# Patient Record
Sex: FEMALE | Race: WHITE | NOT HISPANIC OR LATINO | Employment: OTHER | URBAN - METROPOLITAN AREA
[De-identification: names, ages, dates, MRNs, and addresses within clinical notes are randomized per-mention and may not be internally consistent; named-entity substitution may affect disease eponyms.]

---

## 2018-04-12 ENCOUNTER — OFFICE VISIT CONVERTED (OUTPATIENT)
Dept: OTHER | Facility: HOSPITAL | Age: 73
End: 2018-04-12
Attending: INTERNAL MEDICINE

## 2018-05-17 ENCOUNTER — OFFICE VISIT CONVERTED (OUTPATIENT)
Dept: OTHER | Facility: HOSPITAL | Age: 73
End: 2018-05-17
Attending: INTERNAL MEDICINE

## 2018-07-19 ENCOUNTER — OFFICE VISIT CONVERTED (OUTPATIENT)
Dept: OTHER | Facility: HOSPITAL | Age: 73
End: 2018-07-19
Attending: INTERNAL MEDICINE

## 2019-02-07 ENCOUNTER — OFFICE VISIT CONVERTED (OUTPATIENT)
Dept: OTHER | Facility: HOSPITAL | Age: 74
End: 2019-02-07
Attending: INTERNAL MEDICINE

## 2019-06-13 ENCOUNTER — OFFICE VISIT CONVERTED (OUTPATIENT)
Dept: OTHER | Facility: HOSPITAL | Age: 74
End: 2019-06-13
Attending: INTERNAL MEDICINE

## 2019-10-17 ENCOUNTER — OFFICE VISIT CONVERTED (OUTPATIENT)
Dept: OTHER | Facility: HOSPITAL | Age: 74
End: 2019-10-17
Attending: INTERNAL MEDICINE

## 2020-02-13 ENCOUNTER — OFFICE VISIT CONVERTED (OUTPATIENT)
Dept: OTHER | Facility: HOSPITAL | Age: 75
End: 2020-02-13
Attending: INTERNAL MEDICINE

## 2020-06-11 ENCOUNTER — CONVERSION ENCOUNTER (OUTPATIENT)
Dept: OTHER | Facility: HOSPITAL | Age: 75
End: 2020-06-11

## 2021-05-28 VITALS
HEART RATE: 83 BPM | BODY MASS INDEX: 24.7 KG/M2 | TEMPERATURE: 98.8 F | DIASTOLIC BLOOD PRESSURE: 80 MMHG | DIASTOLIC BLOOD PRESSURE: 67 MMHG | HEIGHT: 63 IN | WEIGHT: 139.4 LBS | DIASTOLIC BLOOD PRESSURE: 78 MMHG | BODY MASS INDEX: 22.9 KG/M2 | HEART RATE: 102 BPM | RESPIRATION RATE: 18 BRPM | TEMPERATURE: 98.9 F | RESPIRATION RATE: 16 BRPM | BODY MASS INDEX: 22.78 KG/M2 | OXYGEN SATURATION: 93 % | DIASTOLIC BLOOD PRESSURE: 82 MMHG | WEIGHT: 138.1 LBS | DIASTOLIC BLOOD PRESSURE: 68 MMHG | OXYGEN SATURATION: 97 % | HEART RATE: 107 BPM | WEIGHT: 133.6 LBS | TEMPERATURE: 97.7 F | SYSTOLIC BLOOD PRESSURE: 145 MMHG | SYSTOLIC BLOOD PRESSURE: 140 MMHG | TEMPERATURE: 97.8 F | HEIGHT: 63 IN | HEIGHT: 63 IN | HEIGHT: 63 IN | WEIGHT: 129.25 LBS | WEIGHT: 128.56 LBS | OXYGEN SATURATION: 90 % | SYSTOLIC BLOOD PRESSURE: 137 MMHG | HEIGHT: 63 IN | OXYGEN SATURATION: 96 % | TEMPERATURE: 98 F | HEART RATE: 76 BPM | OXYGEN SATURATION: 98 % | SYSTOLIC BLOOD PRESSURE: 156 MMHG | RESPIRATION RATE: 18 BRPM | BODY MASS INDEX: 23.04 KG/M2 | HEIGHT: 63 IN | WEIGHT: 137.4 LBS | HEIGHT: 63 IN | HEART RATE: 108 BPM | HEART RATE: 86 BPM | SYSTOLIC BLOOD PRESSURE: 133 MMHG | WEIGHT: 130 LBS | BODY MASS INDEX: 24.29 KG/M2 | DIASTOLIC BLOOD PRESSURE: 77 MMHG | WEIGHT: 137.1 LBS | HEART RATE: 90 BPM | HEIGHT: 63 IN | DIASTOLIC BLOOD PRESSURE: 84 MMHG | DIASTOLIC BLOOD PRESSURE: 78 MMHG | TEMPERATURE: 98 F | OXYGEN SATURATION: 96 % | TEMPERATURE: 98 F | OXYGEN SATURATION: 98 % | RESPIRATION RATE: 16 BRPM | BODY MASS INDEX: 23.67 KG/M2 | OXYGEN SATURATION: 98 % | HEART RATE: 88 BPM | BODY MASS INDEX: 24.34 KG/M2 | SYSTOLIC BLOOD PRESSURE: 160 MMHG | SYSTOLIC BLOOD PRESSURE: 122 MMHG | SYSTOLIC BLOOD PRESSURE: 143 MMHG | BODY MASS INDEX: 24.47 KG/M2 | RESPIRATION RATE: 18 BRPM

## 2021-05-28 NOTE — PROGRESS NOTES
Patient: CHANEL ISBELL     Acct: [id number]     Report: #[id number]  UNIT #: [id number]     : 1945    Encounter Date:2018  PRIMARY CARE:   ***Signed***  --------------------------------------------------------------------------------------------------------------------  DATE: 18      Primary Care Provider      Primary Care Provider:  BRANDON WHALEN            Referring Physician      Referring Provider:  BRANDON WHALEN            Chief Complaint      Follow up CLL            Subjective      Patient is seen earlier than usual because of recent onset iron deficiency.      She has been started on iron replacement and vitamin C.      Colonoscopy has been scheduled for .      Patient feels well.            Past Med/Surg History            Past Med/Surg History:   No Hypertension             No Diabetes Mellitus             No Heart Disease             No Blood Clots             Cancer             No Lung Disease             No Kidney Disease             No Other            Social History      Social History:  No Tobacco Use, No Alcohol Use, No Recreational Drug use, No     Other            Allergies      Coded Allergies:             PENICILLINS (Verified  Allergy, Unknown, 18)            Medications      Medications    Last Reconciled on 18 13:26 by SHARMAINE EMANUEL MD      Allopurinol (Zyloprim*) 300 Mg Tablet      300 MG PO QDAY for 30 Days, #90 TAB 3 Refills         Prov: Urvashi Emanuel         18       Idelalisib (Zydelig) 150 Mg Tablet      150 MG PO QDAY for 30 Days, #30 TAB         Reported         18       Cyanocobalamin (Vitamin B-12*) 50 Mcg Tablet      50 MCG PO QDAY, TAB         Reported         18       Aspirin ([place] Aspirin*) 81 Mg Tab.chew      81 MG PO QDAY, TAB.CHEW         Reported         18       Niacin (Niacin) 500 Mg Capsule.er      500 MG PO QDAY, CAP         Reported         18       Omega-3 Fatty Acids/Fish Oil (Fish Oil 1000 Mg) 1 Each  Capsule      1 GM PO BID, #60 CAP 0 Refills         Reported         4/12/18       Potassium Chloride (Klor-Con) 8 Meq Tablet.er      8 MEQ PO BID, TAB         Reported         4/12/18       Captopril (Captopril) 25 Mg Tablet      25 MG PO QDAY, #30 TAB 0 Refills         Reported         4/12/18       Ascorbic Acid (Vitamin C*) 500 Mg Tablet      500 MG PO BID for 90 Days, #180 TAB 3 Refills         Prov: Veza,Urvashi         4/12/18       Ferrous Sulfate (Ferrous Sulfate*) 325 Mg Tablet      325 MG PO TID for 90 Days, #270 TAB 0 Refills         Prov: Veza,Urvashi         4/12/18      Current Medications      Current Medications Reviewed 5/17/18            Pain Assessment      Pain Intensity:  0      Description:  None            Review of Systems      General:  No Anxiety, No Fatigue Scale:, No Pain Scale:, No Fever, No Other      HEENT:  No Dysphagia, No Hearing Changes, No Rhinorrhea, No Tinnitus, No Visual     Changes, No Nasal Congestion, No Epistaxis, No Other      Respiratory:  No Cough, No Shortness of Air, No Sputum Changes, No Wheezing, No     Hemoptysis, No Congestion, No Other      Cardiovascular:  No Chest Pain, No Pedal Edema, No Orthopnea, No Palpitations,     No Chest Pressure, No Dizziness, No Other      Gastrointestinal:  No Nausea, No Vomiting, No Dysphagia, No Constipation,     Diarrhea, Appetite Good, No Appetite Fair, No Appetite Poor, No Early Satiety,     No Other      Genitourinary:  No Nocturia, No Dysuria, No Other      Musculoskeletal:  No Joint Effusions, No Joint Tenderness, No Joint Stiffness,     No Myalgias, No Aches, No Pains, No Other      Endocrine:  No Heat Intolerance, No Cold Intolerance, No Fatigue, No Blood     Sugar Control, No Other      Hematologic:  No Bleeding, No Bruising, No Swollen Glands, No Other      Allergic/Immunologic:  No Hives, No Throat closing off, No Nasal drip, No Itchy     eyes, No Hay fever, No Other      Psychological:  No Anxiety, No Depression, No  Other      Neurological:  No Headaches, No Dizziness, No Weakness, No Numbness, No Other      Skin:  No Rash, No Open Wounds, No Edema, No Other            Vitals      Height 5 ft 3 in / 160.02 cm      Weight 129 lbs 4 oz / 58.893941 kg      BSA 1.62 m2      BMI 22.9 kg/m2      Temperature 98.8 F / 37.11 C - Temporal      Pulse 102      Blood Pressure 122/68 Sitting, Left Arm      Pulse Oximetry 96%, room air            Exam      Constitutional:  No acute distress, Conversant, Pleasant      Eyes:  Anicteric sclerae, Palpebral Conjunctivae, RENEE      HENT:  Oropharynx clear, No Erythema, Buccal mucosae      Neck:  Supple, Full Range of Motion      Cardiovascular:  RRR, Murmurs, Normal PMI, No Peripheral Edema      Lungs:  Clear to Ausculation, Normal Respiratory Effort      Abdomen:  Soft, NABS, No Tenderness      Chest:  Other (symmetrical and equal)      Lymphatic:  No Neck, Axillae (left)      Extremities:  No digital cyanosis, Normal gait      Neurological:  Cranial Nerve II-XII (Intact), No Focal Sensory deficits      Psychological:  Appropriate affect, Appropriate mood, Intact judgement, Alert      Skin:  Other (no dermatosis)            Lab Results      Iron profile normal      Hemoglobin 12.5/hematocrit 40.6, white count 14.6 , platelet count 254,000            Impression/Problem List      Chronic lymphocytic leukemia, positive TP 53, +13 q deletion on IDELALIS IB      150 mg 2 times a day      Iron deficiency for colonoscopy on the 31st      Hypertension      Hyperlipidemia      Notes      Renewed Medications      * ALLOPURINOL (Zyloprim*) 300 MG TABLET:       From: 300 MG PO QDAY 30 Days #30       To: 300 MG PO QDAY 30 Days #90            Plan      Continue iron and vitamin C      Colonoscopy July 31      Follow-up in 2 months with CBC, LDH, CMP, iron profile ferritin            Carbon Copy:      Copies To 2:   Carson Elkins            Patient Education:        Chronic Lymphocytic Leukemia             PREVENTION      Hx Influenza Vaccination:  No      Influenza Vaccine Declined:  Yes      2 or More Falls Past Year?:  No      Fall Past Year with Injury?:  No      Encouraged to follow-up with:  PCP regarding preventative exams.      Chart initiated by      Chani Cortes MA                 Disclaimer: Converted document may not contain table formatting or lab diagrams. Please see [employer] for the authenticated document.

## 2021-05-28 NOTE — PROGRESS NOTES
Patient: CHANEL ISBELL     Acct: [id number]     Report: #[id number]  UNIT #: [id number]     : 1945    Encounter Date:2020  PRIMARY CARE:   ***Signed***  --------------------------------------------------------------------------------------------------------------------  DATE: 20      Primary Care Provider      Primary Care Provider:  BRANDON WHALEN            Referring Physician      Referring Provider:  BRANDON WHALEN            Chief Complaint      FU CLL            Subjective      74-year-old white female with a history of chronic lymphocytic leukemia patient     is in remission and is on idelalisib 150 mg 2 times a day.  Patient claims that     this is her third year.            Patient is doing well with no side effects.            Past Med/Surg History            Past Med/Surg History:   Hypertension             No Diabetes Mellitus             No Heart Disease             No Blood Clots             Cancer             No Lung Disease             No Kidney Disease             No Other            Social History      Social History:  No Tobacco Use, No Alcohol Use, No Recreational Drug use, No     Other            Allergies      Coded Allergies:             PENICILLINS (Verified  Allergy, Unknown, 18)            Medications      Medications    Last Reconciled on 20 18:58 by SHARMAINE NJ MD      Idelalisib (Zydelig) 150 Mg Tablet      150 MG PO BID for 30 Days, #60 TAB         Reported         18       Cyanocobalamin (Vitamin B-12*) 50 Mcg Tablet      50 MCG PO QDAY, TAB         Reported         18       Aspirin Chew (Aspirin Chew) 81 Mg Tab.chew      81 MG PO QDAY, TAB.CHEW         Reported         18       Niacin (Niacin) 500 Mg Capsule.er      500 MG PO QDAY, CAP         Reported         18       Omega-3 Fatty Acids/Fish Oil (Fish Oil 1000 Mg) 1 Each Capsule      1 GM PO BID, #60 CAP 0 Refills         Reported         18       Potassium Chloride (Klor-Con) 8  Meq Tablet.er      8 MEQ PO BID, TAB         Reported         4/12/18       Captopril (Captopril) 25 Mg Tablet      0.5 TAB PO BID, #30 TAB 0 Refills         Reported         4/12/18       Ascorbic Acid (Vitamin C*) 500 Mg Tablet      500 MG PO BID for 90 Days, #180 TAB 3 Refills         Prov: Dewayne Emanuelazon         4/12/18      Current Medications      Current Medications Reviewed 2/13/20            Pain Assessment      Pain Intensity:  0      Description:  None            Review of Systems      General:  No Anxiety; Fatigue Scale: (0), Pain Scale: (0)      HEENT:  No Dysphagia, No Hearing Changes      Respiratory:  No Cough      Cardiovascular:  No Chest Pain, No Pedal Edema      Gastrointestinal:  No Nausea; Appetite Good (Good)      Genitourinary:  No Nocturia      Musculoskeletal:  No Joint Effusions      Endocrine:  No Heat Intolerance      Hematologic:  No Bleeding, No Bruising      Allergic/Immunologic:  No Hives      Psychological:  No Anxiety      Neurological:  No Headaches, No Dizziness      Skin:  No Rash            Vitals      Height 5 ft 3 in / 160.02 cm      Weight 137 lbs 6.4 oz / 62.489737 kg      BSA 1.65 m2      BMI 24.3 kg/m2      Temperature 98.0 F / 36.67 C      Pulse 107      Respirations 18      Blood Pressure 145/78      Pulse Oximetry 97%            Exam      Constitutional:  No acute distress, Conversant, Pleasant      Eyes:  Anicteric sclerae, Palpebral Conjunctivae (Pink), RENEE      HENT:  Oropharynx clear; No Erythema; Buccal mucosae (Pink)      Neck:  Supple, Full Range of Motion      Cardiovascular:  RRR; No Murmurs; Normal PMI; No Peripheral Edema      Lungs:  Clear to Ausculation, Normal Respiratory Effort      Abdomen:  NABS; No Tenderness      Chest:  Other (Symmetrical)      Lymphatic:  No Neck      Extremities:  No digital cyanosis, No digital ischemia, Normal gait, Other (No     deformity)      Neurological:  Cranial Nerve II-XII; No Focal Sensory deficits      Psychological:   Appropriate affect, Appropriate mood, Intact judgement, Alert      Skin:  Other (No dermatosis)            Impression/Problem List      Chronic lymphocytic leukemia, positive TP 53, +13 q deletion on IDELALISIB  150     mg 2 times a day      Hypertension      Hyperlipidemia      Notes      Changed Medications      * Captopril 25 MG TABLET: 0.5 TAB PO BID #30            Plan            Continue Zydelig      Follow-up in 4 months with CBC, LDH, CMP            Patient Education:        Chronic Lymphocytic Leukemia            PREVENTION      Influenza Vaccine Declined:  Yes      2 or More Falls Past Year?:  No      Fall Past Year with Injury?:  No      Encouraged to follow-up with:  PCP regarding preventative exams.      Chart initiated by      DAVID Gallegos MA            Electronically signed by Urvashi Emanuel  02/13/2020 18:58       Disclaimer: Converted document may not contain table formatting or lab diagrams. Please see [employer] for the authenticated document.

## 2021-05-28 NOTE — PROGRESS NOTES
Patient: CHANEL ISBELL     Acct: [id number]     Report: #[id number]  UNIT #: [id number]     : 1945    Encounter Date:2019  PRIMARY CARE:   ***Signed***  --------------------------------------------------------------------------------------------------------------------  DATE: 19      Primary Care Provider      Primary Care Provider:  BRANDON WHALEN            Referring Physician      Referring Provider:  BRANDON WHALEN            Chief Complaint      FU CLL            Subjective      74-year-old female with a history of chronic lymphocytic leukemia initially     diagnosed in  and recurred in 2017 presenting as transfusion dependent    anemia.  Patient also had a change in her fish from normal to presence of 13 q.     deletion and TP 53.  Patient had been started on idelalisib 150 mg twice a day.     Patient had normalization of her blood count.            She offers no untoward side effects from the treatment.            Past Med/Surg History            Past Med/Surg History:   Hypertension             No Diabetes Mellitus             No Heart Disease             No Blood Clots             Cancer             No Lung Disease             No Kidney Disease             No Other            Social History      Social History:  No Tobacco Use, No Alcohol Use, No Recreational Drug use, No     Other            Allergies      Coded Allergies:             PENICILLINS (Verified  Allergy, Unknown, 18)            Medications      Medications    Last Reconciled on 18 13:26 by SHARMAINE NJ MD      Idelalisib (Zydelig) 150 Mg Tablet      150 MG PO QDAY for 30 Days, #30 TAB         Reported         18       Cyanocobalamin (Vitamin B-12*) 50 Mcg Tablet      50 MCG PO QDAY, TAB         Reported         18       Aspirin Chew (Aspirin Chew) 81 Mg Tab.chew      81 MG PO QDAY, TAB.CHEW         Reported         18       Niacin (Niacin) 500 Mg Capsule.er      500 MG PO QDAY, CAP          Reported         4/12/18       Omega-3 Fatty Acids/Fish Oil (Fish Oil 1000 Mg) 1 Each Capsule      1 GM PO BID, #60 CAP 0 Refills         Reported         4/12/18       Potassium Chloride (Klor-Con) 8 Meq Tablet.er      8 MEQ PO BID, TAB         Reported         4/12/18       Captopril (Captopril) 25 Mg Tablet      25 MG PO QDAY, #30 TAB 0 Refills         Reported         4/12/18       Ascorbic Acid (Vitamin C*) 500 Mg Tablet      500 MG PO BID for 90 Days, #180 TAB 3 Refills         Prov: VezaDewayneKaneohe         4/12/18      Current Medications      Current Medications Reviewed 6/13/19            Pain Assessment      Pain Intensity:  0      Description:  None            Review of Systems      General:  No Anxiety; Fatigue Scale: (0), Pain Scale: (0)      HEENT:  No Dysphagia, No Hearing Changes      Respiratory:  No Cough      Cardiovascular:  No Chest Pain      Gastrointestinal:  No Nausea, No Vomiting; Appetite Good (good)      Genitourinary:  No Nocturia      Musculoskeletal:  No Joint Effusions      Endocrine:  No Heat Intolerance, No Cold Intolerance      Hematologic:  No Bleeding      Allergic/Immunologic:  No Hives      Psychological:  No Anxiety      Neurological:  No Headaches, No Dizziness      Skin:  No Rash            Vitals      Height 5 ft 3 in / 160.02 cm      Weight 137 lbs 1.6 oz / 62.740154 kg      BSA 1.65 m2      BMI 24.3 kg/m2      Temperature 98.0 F / 36.67 C      Pulse 76      Respirations 16      Blood Pressure 160/80      Pulse Oximetry 90%            Exam      Constitutional:  No acute distress, Conversant, Pleasant      Eyes:  Anicteric sclerae, Palpebral Conjunctivae ( pink), RENEE      HENT:  Oropharynx clear; No Erythema, No Tonsils; Buccal mucosae (Pink)      Neck:  Supple      Cardiovascular:  RRR; No Murmurs; Normal PMI; No Peripheral Edema      Lungs:  Clear to Ausculation, Normal Respiratory Effort      Abdomen:  Soft, NABS; No Tenderness      Chest:  Other (Symmetrical and equal)       Lymphatic:  No Neck, No Axillae      Extremities:  No digital cyanosis, No digital ischemia, Normal gait, Other (No     deformity)      Neurological:  Cranial Nerve II-XII (Intact); No Focal Sensory deficits      Psychological:  Appropriate affect, Appropriate mood, Intact judgement, Alert      Skin:  Other (No dermatosis)            Lab Results      CMP normal, LDH normal      CBC normal            Impression/Problem List      Chronic lymphocytic leukemia, positive TP 53, +13 q deletion on IDELALISIB  150     mg 2 times a day      Hypertension      Hyperlipidemia      Notes      Discontinued Medications      * ALLOPURINOL (Zyloprim*) 300 MG TABLET: 300 MG PO QDAY 30 Days #90            Plan      Continue idelalisib 150 mg p.o. twice a day      Follow-up in 4 months with CBC, LDH, CMP            Patient Education:        Chronic Lymphocytic Leukemia            PREVENTION      Influenza Vaccine Declined:  Yes      2 or More Falls Past Year?:  No      Fall Past Year with Injury?:  No      Encouraged to follow-up with:  PCP regarding preventative exams.      Chart initiated by      DAVID Turpin MA            Electronically signed by Urvashi Emanuel  10/23/2019 19:05       Disclaimer: Converted document may not contain table formatting or lab diagrams. Please see [employer] for the authenticated document.

## 2021-05-28 NOTE — PROGRESS NOTES
Patient: CHANEL ISBELL     Acct: [id number]     Report: #[id number]  UNIT #: [id number]     : 1945    Encounter Date:2018  PRIMARY CARE:   ***Signed***  --------------------------------------------------------------------------------------------------------------------  DATE: 18      Primary Care Provider      Primary Care Provider:  BRANDON WHALEN            Referring Physician      Referring Provider:  BRANDON WHALEN            Chief Complaint      Follow up CLL            Subjective      73-year-old white female has history of chronic lymphocytic leukemia diagnosed     in  patient received fludarabine and Cytoxan as well as Rituxan in the     past.  Patient was off treatment until 2017 the patient was noted to have     tripling of her white count and anemia.  FISH done showed presence of T p53 in 9    % of his her leukocytes and 11% 13 Q deletion.  Patient was started on zydelig     2 tablets daily.  Unfortunately the patient got sick and had pneumonia and this     was discontinued after 3 days.  Medication list resumed on  1 tablet per     day.  Patient's white count went back to normal and her hemoglobin as well.      She was restarted on Criss is 2 tablets a day on 2017 and has continued     to do so without any side effects.            Patient was noted to be iron deficient and the colonoscopy was performed on     [address] 30 first.  She also had a problem with her abnormal mammogram.  Ultrasound     was done and Dr. Elkins saw her on consult.            Past Med/Surg History            Past Med/Surg History:   Hypertension             No Diabetes Mellitus             No Heart Disease             No Blood Clots             Cancer             No Lung Disease             No Kidney Disease             No Other            Social History      Social History:  No Tobacco Use, No Alcohol Use, No Recreational Drug use, No     Other            Allergies      Coded Allergies:              PENICILLINS (Verified  Allergy, Unknown, 5/17/18)            Medications      Medications    Last Reconciled on 7/25/18 13:09 by SHARMAINE NJ MD      Allopurinol (Zyloprim*) 300 Mg Tablet      300 MG PO QDAY for 30 Days, #90 TAB 3 Refills         Prov: Veza,[place]         5/17/18       Idelalisib (Zydelig) 150 Mg Tablet      150 MG PO QDAY for 30 Days, #30 TAB         Reported         4/12/18       Cyanocobalamin (Vitamin B-12*) 50 Mcg Tablet      50 MCG PO QDAY, TAB         Reported         4/12/18       Aspirin ([place] Aspirin*) 81 Mg Tab.chew      81 MG PO QDAY, TAB.CHEW         Reported         4/12/18       Niacin (Niacin) 500 Mg Capsule.er      500 MG PO QDAY, CAP         Reported         4/12/18       Omega-3 Fatty Acids/Fish Oil (Fish Oil 1000 Mg) 1 Each Capsule      1 GM PO BID, #60 CAP 0 Refills         Reported         4/12/18       Potassium Chloride (Klor-Con) 8 Meq Tablet.er      8 MEQ PO BID, TAB         Reported         4/12/18       Captopril (Captopril) 25 Mg Tablet      25 MG PO QDAY, #30 TAB 0 Refills         Reported         4/12/18       Ascorbic Acid (Vitamin C*) 500 Mg Tablet      500 MG PO BID for 90 Days, #180 TAB 3 Refills         Prov: Veza,Ruvashi         4/12/18       Ferrous Sulfate (Ferrous Sulfate*) 325 Mg Tablet      325 MG PO TID for 90 Days, #270 TAB 0 Refills         Prov: Veza,Urvashi         4/12/18      Current Medications      Current Medications Reviewed 7/19/18            Pain Assessment      Description:  None            Review of Systems      General:  No Anxiety, No Fatigue Scale:, No Pain Scale:, No Fever, No Other      HEENT:  No Dysphagia, No Hearing Changes, No Rhinorrhea, No Tinnitus, No Visual     Changes, No Nasal Congestion, No Epistaxis, No Other      Respiratory:  No Cough, No Shortness of Air, No Sputum Changes, No Wheezing, No     Hemoptysis, No Congestion, No Other      Cardiovascular:  No Chest Pain, No Pedal Edema, No Orthopnea, No  Palpitations,     No Chest Pressure, No Dizziness, No Other      Gastrointestinal:  No Nausea, No Vomiting, No Dysphagia, Constipation, No     Diarrhea, Appetite Good, No Appetite Fair, No Appetite Poor, No Early Satiety,     No Other      Genitourinary:  No Nocturia, No Dysuria, No Other      Musculoskeletal:  No Joint Effusions, No Joint Tenderness, No Joint Stiffness,     No Myalgias, No Aches, No Pains, No Other      Endocrine:  No Heat Intolerance, No Cold Intolerance, No Fatigue, No Blood     Sugar Control, No Other      Hematologic:  No Bleeding, No Bruising, No Swollen Glands, No Other      Allergic/Immunologic:  No Hives, No Throat closing off, No Nasal drip, No Itchy     eyes, No Hay fever, No Other      Psychological:  No Anxiety, No Depression, No Other      Neurological:  No Headaches, No Dizziness, No Weakness, No Numbness, No Other      Skin:  No Rash, No Open Wounds, No Edema, No Other            Vitals      Height 5 ft 3 in / 160.02 cm      Weight 130 lbs 0 oz / 58.700528 kg      BSA 1.63 m2      BMI 23.0 kg/m2      Temperature 97.7 F / 36.5 C - Temporal      Pulse 88      Blood Pressure 140/82 Sitting, Left Arm      Pulse Oximetry 93%, room air            Exam      Constitutional:  No acute distress, Conversant, Pleasant, No Weakness      Eyes:  Anicteric sclerae, Palpebral Conjunctivae, RENEE      HENT:  Oropharynx clear, No Erythema, Buccal mucosae (pink)      Neck:  Supple, Full Range of Motion      Cardiovascular:  RRR, Murmurs, Normal PMI      Lungs:  Clear to Ausculation, Normal Respiratory Effort      Abdomen:  Soft, NABS, No Masses      Chest:  Other (symmetrical and equal)      Lymphatic:  No Neck, Axillae (left axilla mobile about 1 x 1 cm), No Groin      Extremities:  No digital cyanosis, Normal gait, Other (no deformity no     limitation of range of motion)      Neurological:  Cranial Nerve II-XII, No Focal Sensory deficits      Psychological:  Appropriate affect, Intact judgement,  Alert      Skin:  Normal temperature, Other (no dermatosis)            Lab Results      CBC 8.3, hemoglobin 13.7, hematocrit 41.3, platelet count 254,000 with 35.4     neutrophils, lymphocytes 52.2, monocytes 8.4, eosinophils 2.3.      CMP normal, iron profile normal,  ferritin normal      Radiology Impressions      Ultrasound of the left axilla showed a 2.6 in mid or hyperechoic mass worrisome     for lymphadenopathy.  Another 1.2 x 2.4 mass was also found.  This is     consistent with leukemia.            Impression/Problem List      Chronic lymphocytic leukemia in remission on Idella Lori 150 mg 2 times a     day      Iron deficiency anemia resolved.  Normal colonoscopy      Hypertension      Hyperlipidemia            Plan      Continue done less than 150 mg 2 times a day      Finished leftover iron replacement.      Return to clinic in October with a CBC, iron profile, ferritin, CMP, LDH            Patient Education:        Bladder Cancer      Chronic Lymphocytic Leukemia            PREVENTION      Hx Influenza Vaccination:  No      2 or More Falls Past Year?:  No      Fall Past Year with Injury?:  No      Encouraged to follow-up with:  PCP regarding preventative exams.      Chart initiated by      Chani Cortes MA                 Disclaimer: Converted document may not contain table formatting or lab diagrams. Please see [employer] for the authenticated document.

## 2021-05-28 NOTE — PROGRESS NOTES
Patient: CHANEL ISBELL     Acct: [id number]     Report: #[id number]  UNIT #: [id number]     : 1945    Encounter Date:10/17/2019  PRIMARY CARE:   ***Signed***  --------------------------------------------------------------------------------------------------------------------  DATE: 10/17/19      Primary Care Provider      Primary Care Provider:  BRANDON WHALEN            Referring Physician      Referring Provider:  BRANDON WHALEN            Chief Complaint      FU CLL            Subjective      74-year-old white female has a history of chronic lymphocytic with 13 q.     deletion and T p53 deletion.  She was initially diagnosed in  and had     received chemotherapy which consisted of Fludara Cytoxan and Rituxan which were     not given according to protocol because the patient was having problems.      Initial treatment was because of symptomatic lymphadenopathy.  In 2016     there was a noted progressively increasing white count but patient was     asymptomatic.  CLL onco FISH was normal .  In  patient was noted to     be anemic this is stating blood transfusion.  CLL on call FISH showed 13 q.     deletion and positive T p53.  Patient was started on Zydelig with disappearance     of anemia and normalization of white count.  Patient has been receiving Zydelig     150 mg 2 times a day.            Patient has been doing very well.  She has walked 3 miles this morning.            Past Med/Surg History            Past Med/Surg History:   Hypertension             No Diabetes Mellitus             No Heart Disease             No Blood Clots             Cancer             No Lung Disease             No Kidney Disease             No Other            Social History      Social History:  No Tobacco Use, No Alcohol Use, No Recreational Drug use, No     Other            Allergies      Coded Allergies:             PENICILLINS (Verified  Allergy, Unknown, 18)            Medications       Medications    Last Reconciled on 10/23/19 18:59 by SHARMAINE EMANUEL MD      Idelalisib (Zydelig) 150 Mg Tablet      150 MG PO BID for 30 Days, #60 TAB         Reported         4/12/18       Cyanocobalamin (Vitamin B-12*) 50 Mcg Tablet      50 MCG PO QDAY, TAB         Reported         4/12/18       Aspirin Chew (Aspirin Chew) 81 Mg Tab.chew      81 MG PO QDAY, TAB.CHEW         Reported         4/12/18       Niacin (Niacin) 500 Mg Capsule.er      500 MG PO QDAY, CAP         Reported         4/12/18       Omega-3 Fatty Acids/Fish Oil (Fish Oil 1000 Mg) 1 Each Capsule      1 GM PO BID, #60 CAP 0 Refills         Reported         4/12/18       Potassium Chloride (Klor-Con) 8 Meq Tablet.er      8 MEQ PO BID, TAB         Reported         4/12/18       Captopril (Captopril) 25 Mg Tablet      25 MG PO QDAY, #30 TAB 0 Refills         Reported         4/12/18       Ascorbic Acid (Vitamin C*) 500 Mg Tablet      500 MG PO BID for 90 Days, #180 TAB 3 Refills         Prov: Urvashi Emanuel         4/12/18      Current Medications      Current Medications Reviewed 10/17/19            Pain Assessment      Pain Intensity:  0      Description:  None            Review of Systems      General:  No Anxiety; Fatigue Scale: (0), Pain Scale: (0)      HEENT:  No Dysphagia      Respiratory:  No Cough, No Shortness of Air      Cardiovascular:  No Chest Pain      Gastrointestinal:  No Nausea; Appetite Good (Good)      Genitourinary:  No Nocturia, No Dysuria      Musculoskeletal:  No Joint Effusions      Endocrine:  No Heat Intolerance      Hematologic:  No Bleeding, No Bruising      Allergic/Immunologic:  No Hives      Psychological:  No Anxiety      Neurological:  No Headaches, No Dizziness      Skin:  No Rash            Vitals      Height 5 ft 3 in / 160.02 cm      Weight 138 lbs 1.6 oz / 62.12733 kg      BSA 1.65 m2      BMI 24.5 kg/m2      Temperature 97.8 F / 36.56 C      Pulse 90      Respirations 16      Blood Pressure 156/84      Pulse Oximetry  98%            Exam      Constitutional:  No acute distress, Conversant, Pleasant      Eyes:  Anicteric sclerae, Palpebral Conjunctivae (Pink), RENEE      HENT:  Oropharynx clear; No Erythema, No Tonsils; Buccal mucosae (Pink)      Neck:  Supple, Full Range of Motion      Cardiovascular:  RRR; No Murmurs; Normal PMI; No Peripheral Edema      Lungs:  Clear to Ausculation, Normal Respiratory Effort      Abdomen:  Soft, NABS; No Masses, No Tenderness      Chest:  Other (Symmetrical and equal)      Lymphatic:  No Neck, No Axillae      Extremities:  No digital cyanosis, No digital ischemia, Normal gait, Other (No     deformity)      Neurological:  Cranial Nerve II-XII (Intact); No Focal Sensory deficits      Psychological:  Appropriate affect, Appropriate mood, Intact judgement, Alert      Skin:  Other (No dermatoses)            Lab Results      White count 8, hemoglobin 13.4/hematocrit 40.9, platelet count 205,000     neutrophils 57.6, lymphocytes 31.8, monocytes 7.6.      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            Patient Education:        Chronic Lymphocytic Leukemia            PREVENTION      Influenza Vaccine Declined:  Yes      Encouraged to follow-up with:  PCP regarding preventative exams.      Chart initiated by      DAVID Turpin MA            Electronically signed by Urvashi Emanuel  10/23/2019 18:59       Disclaimer: Converted document may not contain table formatting or lab diagrams. Please see [facility] for the authenticated document.

## 2021-05-28 NOTE — PROGRESS NOTES
Patient: CHANEL ISBELL     Acct: [id number]     Report: #[id number]  UNIT #: [id number]     : 1945    Encounter Date:2018  PRIMARY CARE:   ***Signed***  --------------------------------------------------------------------------------------------------------------------  DATE: 18      Primary Care Provider      Primary Care Provider:  BRANDON WHALEN            Referring Physician      Referring Provider:  BRANDON WHALEN            Chief Complaint      Follow up CLL            Subjective      72-year-old white female has a history of chronic lymphocytic leukemia     presently on Idelallisib 150 mg by mouth 2 times a day.  Patient has been doing     well and has continued to maintain her weight.            She has no B symptoms.            Past Med/Surg History            Past Med/Surg History:   Hypertension             No Diabetes Mellitus             No Heart Disease             No Blood Clots             Cancer             No Lung Disease             No Kidney Disease             No Other            Social History      Social History:  No Tobacco Use, No Alcohol Use, No Recreational Drug use, No     Other            Allergies      Coded Allergies:             Penicillins (Verified  Allergy, 18)            Medications      Medications    Last Reconciled on 18 13:18 by SHARMAINE EMANUEL MD      Allopurinol (Zyloprim*) 300 Mg Tablet      300 MG PO QDAY for 30 Days, #90 TAB 3 Refills         Prov: Urvashi Emanuel         18       Idelalisib (Zydelig) 150 Mg Tablet      150 MG PO QDAY for 30 Days, #30 TAB         Reported         18       Cyanocobalamin (Vitamin B-12*) 50 Mcg Tablet      50 MCG PO QDAY, TAB         Reported         18       Aspirin ([place] Aspirin*) 81 Mg Tab.chew      81 MG PO QDAY, TAB.CHEW         Reported         18       Niacin (Niacin) 500 Mg Capsule.er      500 MG PO QDAY, CAP         Reported         18       Omega-3 Fatty Acids/Fish Oil (Fish  Oil 1000 Mg) 1 Each Capsule      1 GM PO BID, #60 CAP 0 Refills         Reported         4/12/18       Potassium Chloride (Klor-Con) 8 Meq Tablet.er      8 MEQ PO BID, TAB         Reported         4/12/18       Captopril (Captopril) 25 Mg Tablet      25 MG PO QDAY, #30 TAB 0 Refills         Reported         4/12/18       Ascorbic Acid (Vitamin C*) 500 Mg Tablet      500 MG PO BID for 90 Days, #180 TAB 3 Refills         Prov: Veza,Urvashi         4/12/18       Ferrous Sulfate (Ferrous Sulfate*) 325 Mg Tablet      325 MG PO TID for 90 Days, #270 TAB 0 Refills         Prov: Veza,[place]         4/12/18      Current Medications      Current Medications Reviewed 4/12/18            Pain Assessment      Pain Intensity:  0      Description:  None            Review of Systems      General:  No Anxiety, No Fatigue Scale:, No Pain Scale:, No Fever, No Other      HEENT:  No Dysphagia, No Hearing Changes, No Rhinorrhea, No Tinnitus, No Visual     Changes, No Nasal Congestion, No Epistaxis, No Other      Respiratory:  No Cough, No Shortness of Air, No Sputum Changes, No Wheezing, No     Hemoptysis, No Congestion, No Other      Cardiovascular:  No Chest Pain, No Pedal Edema, No Orthopnea, No Palpitations,     No Chest Pressure, No Dizziness, No Other      Gastrointestinal:  No Nausea, No Vomiting, No Dysphagia, No Constipation,     Diarrhea, Appetite Good, No Appetite Fair, No Appetite Poor, No Early Satiety,     No Other      Genitourinary:  Nocturia (2), No Dysuria, No Other      Musculoskeletal:  No Joint Effusions, No Joint Tenderness, No Joint Stiffness,     No Myalgias, No Aches, No Pains, No Other      Endocrine:  No Heat Intolerance, No Cold Intolerance, No Fatigue, No Blood     Sugar Control, No Other      Hematologic:  Bleeding, Bruising, No Swollen Glands, No Other      Allergic/Immunologic:  No Hives, No Throat closing off, No Nasal drip, No Itchy     eyes, No Hay fever, No Other      Psychological:  No Anxiety, No  Depression, No Other      Neurological:  No Headaches, No Dizziness, No Weakness, No Numbness, No Other      Skin:  No Rash, No Open Wounds, No Edema, No Other            Vitals      Height 5 ft 3 in / 160.02 cm      Weight 128 lbs 9 oz / 58.845899 kg      BSA 1.62 m2      BMI 22.8 kg/m2      Pulse 86      Blood Pressure 137/67 Sitting, Left Arm      Pulse Oximetry 98%, room air            Exam      Constitutional:  No acute distress, Conversant, Pleasant      Eyes:  Anicteric sclerae, Palpebral Conjunctivae (pink), RENEE      HENT:  Oropharynx clear, No Erythema, Buccal mucosae (pink)      Neck:  Supple, Full Range of Motion, No Masses      Cardiovascular:  RRR, Murmurs, Normal PMI, No Peripheral Edema      Lungs:  Clear to Ausculation, Normal Respiratory Effort      Abdomen:  Soft, NABS, No Masses, No Tenderness      Chest:  Other (symmetrical and equal)      Lymphatic:  No Neck, Axillae (left)      Extremities:  No digital cyanosis, Normal gait, Other (no deformity neurology     range of motion)      Neurological:  Cranial Nerve II-XII, No Focal Sensory deficits      Psychological:  Appropriate affect, Intact judgement, Alert      Skin:  Normal temperature, Other (no dermatosis)            Lab Results      Hemoglobin 12.1, hematocrit 37.6, white count 9.7, platelet count 248,000      Iron 44 iron saturation 18      M3.4, total protein 5.4,             Impression/Problem List      Chronic leukocytic leukemia TP 53 positive 13 q deletion positive on 150 mg     twice a day of idelalisib      Hypertension       Hyperlipidemia      New-onset  iron deficiency      Notes      New Medications      * Ferrous Sulfate (Ferrous Sulfate*) 325 MG TABLET: 325 MG PO TID 90 Days #270      * ASCORBIC ACID (Vitamin C*) 500 MG TABLET: 500 MG PO BID 90 Days #180      * Captopril 25 MG TABLET: 25 MG PO QDAY #30      * Potassium Chloride (Klor-Con) 8 MEQ TABLET.ER: 8 MEQ PO BID      * Omega-3 Fatty Acids/Fish Oil (Fish Oil 1000  Mg) 1 EACH CAPSULE: 1 GM PO BID #    60      * NIACIN (Niacin) 500 MG CAPSULE.ER: 500 MG PO QDAY      * ASPIRIN ([place] Aspirin*) 81 MG TAB.CHEW: 81 MG PO QDAY      * Cyanocobalamin (Vitamin B-12*) 50 MCG TABLET: 50 MCG PO QDAY      * ALLOPURINOL (Zyloprim*) 300 MG TABLET: 300 MG PO QDAY 30 Days #30      * IDELALISIB (Zydelig) 150 MG TABLET: 150 MG PO QDAY 30 Days #30            Plan      Because of the new onset iron deficiency patient is to be sent for colonoscopy.      Start on ferrous sulfate 1 tablet 3 times a day and vitamin C 500 mg 2 times a     day.      Return to clinic in 6 weeks with a CBC, iron profile, ferritin, and     reticulocyte count.            PREVENTION      Hx Influenza Vaccination:  No      Influenza Vaccine Declined:  Yes      2 or More Falls Past Year?:  No      Fall Past Year with Injury?:  No      Encouraged to follow-up with:  PCP regarding preventative exams.      Chart initiated by      Chani Cortes MA                 Disclaimer: Converted document may not contain table formatting or lab diagrams. Please see [employer] for the authenticated document.

## 2021-05-28 NOTE — PROGRESS NOTES
Patient: CHANEL ISBELL     Acct: [id number]     Report: #[id number]  UNIT #: [id number]     : 1945    Encounter Date:2019  PRIMARY CARE:   ***Signed***  --------------------------------------------------------------------------------------------------------------------  Progress Note      DATE: 19      Primary Care Provider:  BRANDON WHALEN      Referring Provider:  BRANDON WHALEN      Chief Complaint      FU CLL            Subjective      74-year-old white female with a history of chronic lymphocytic leukemia that was    initially diagnosed in  treated with FCR adjusted because of patient side     effects.  Patient had recurrence in 2017 when she presented with tripling     of her white count as well as transfusion dependent anemia.  Patient's FISH     results also showed +13 q. deletion and T p53 deletion.  Patient has been     started on idelalisib 150 mg 2 times a day.            Patient claims she is okay except for sinus drainage that bothers her at night .            Past Med/Surg History            Past Med/Surg History:   No Hypertension             No Diabetes Mellitus             No Heart Disease             No Blood Clots             Cancer             No Lung Disease             No Kidney Disease             No Other            Social History      Social History:  No Tobacco Use, No Alcohol Use, No Recreational Drug use, No     Other            Allergies      Coded Allergies:             PENICILLINS (Verified  Allergy, Unknown, 18)            Medications      Medications    Last Reconciled on 18 13:26 by SHARMAINE EMANUEL MD      Allopurinol (Zyloprim*) 300 Mg Tablet      300 MG PO QDAY for 30 Days, #90 TAB 3 Refills         Prov: Urvashi Emanuel         18       Idelalisib (Zydelig) 150 Mg Tablet      150 MG PO QDAY for 30 Days, #30 TAB         Reported         18       Cyanocobalamin (Vitamin B-12*) 50 Mcg Tablet      50 MCG PO QDAY, TAB         Reported          4/12/18       Aspirin Chew (Aspirin Chew) 81 Mg Tab.chew      81 MG PO QDAY, TAB.CHEW         Reported         4/12/18       Niacin (Niacin) 500 Mg Capsule.er      500 MG PO QDAY, CAP         Reported         4/12/18       Omega-3 Fatty Acids/Fish Oil (Fish Oil 1000 Mg) 1 Each Capsule      1 GM PO BID, #60 CAP 0 Refills         Reported         4/12/18       Potassium Chloride (Klor-Con) 8 Meq Tablet.er      8 MEQ PO BID, TAB         Reported         4/12/18       Captopril (Captopril) 25 Mg Tablet      25 MG PO QDAY, #30 TAB 0 Refills         Reported         4/12/18       Ascorbic Acid (Vitamin C*) 500 Mg Tablet      500 MG PO BID for 90 Days, #180 TAB 3 Refills         Prov: Veza,[place]         4/12/18      Current Medications      Current Medications Reviewed 2/7/19            Pain Assessment      Pain Intensity:  0      Description:  None            Review of Systems      General:  No Anxiety; Fatigue Scale: (0), Pain Scale: (0)      HEENT:  No Dysphagia; Nasal Congestion, Other (Sinus Drainage)      Respiratory:  Cough (dry)      Cardiovascular:  No Chest Pain, No Pedal Edema      Gastrointestinal:  No Nausea, No Vomiting; Appetite Good (good)      Genitourinary:  No Nocturia, No Dysuria      Musculoskeletal:  No Joint Effusions      Endocrine:  No Heat Intolerance      Hematologic:  No Bleeding, No Bruising      Allergic/Immunologic:  No Hives      Psychological:  No Anxiety      Neurological:  No Headaches      Skin:  No Rash, No Open Wounds            Vitals      Height 5 ft 3 in / 160.02 cm      Weight 133 lbs 9.6 oz / 60.931958 kg      BSA 1.63 m2      BMI 23.7 kg/m2      Temperature 98.0 F / 36.67 C      Pulse 108      Respirations 18      Blood Pressure 133/77      Pulse Oximetry 98%            Exam      Constitutional:  No acute distress, Conversant, Pleasant      Eyes:  Anicteric sclerae, Palpebral Conjunctivae (Pink), RENEE      HENT:  Oropharynx clear, Erythema; No Tonsils; Buccal mucosae (Patient)       Neck:  Supple, Full Range of Motion; No Masses      Cardiovascular:  RRR; No Murmurs; Normal PMI; No Peripheral Edema      Lungs:  Clear to Ausculation      Abdomen:  Soft, NABS; No Masses, No Tenderness      Chest:  Other (Symmetrical and equal)      Lymphatic:  No Neck, No Axillae      Extremities:  No digital cyanosis, No digital ischemia, Normal gait, Other (No     deformity)      Neurological:  Cranial Nerve II-XII (Intact); No Focal Sensory deficits      Psychological:  Appropriate affect, Appropriate mood, Intact judgement, Alert      Skin:  Other (No dermatosis)            Lab Results      CMP normal except for glucose of 112, LDH normal CBC normal            Impression/Problem List      Chronic lymphocytic leukemia, positive 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, +13 q deletion on IDELALISIB  150     mg 2 times a day      Sinusitis      Hypertension      Hyperlipidemia      Notes      Discontinued Medications      * Ferrous Sulfate (Ferrous Sulfate*) 325 MG TABLET: 325 MG PO TID 90 Days #270            Plan      Artemio with her family physician her hyperlipidemia      Continue Zydelig      Discontinue allopurinol and aspirin      Follow-up in 4 months with CBC, LDH, CMP            Patient Education:        Chronic Lymphocytic Leukemia            PREVENTION      Hx Influenza Vaccination:  No      Influenza Vaccine Declined:  Yes      2 or More Falls Past Year?:  No      Fall Past Year with Injury?:  No      Encouraged to follow-up with:  PCP regarding preventative exams.      Chart initiated by      DAVID Turpin MA            Electronically signed by Urvashi Emanuel  10/23/2019 19:11       Disclaimer: Converted document may not contain table formatting or lab diagrams. Please see [employer] [facility] for the authenticated document.

## 2021-06-04 ENCOUNTER — TRANSCRIBE ORDERS (OUTPATIENT)
Dept: ADMINISTRATIVE | Facility: HOSPITAL | Age: 76
End: 2021-06-04

## 2021-06-04 DIAGNOSIS — C91.10 CHRONIC LYMPHATIC LEUKEMIA (HCC): Primary | ICD-10-CM

## 2021-06-15 ENCOUNTER — HOSPITAL ENCOUNTER (OUTPATIENT)
Dept: CT IMAGING | Facility: HOSPITAL | Age: 76
Discharge: HOME | End: 2021-06-15
Admitting: INTERNAL MEDICINE

## 2021-06-15 DIAGNOSIS — C91.10 CHRONIC LYMPHATIC LEUKEMIA (HCC): ICD-10-CM

## 2021-06-15 LAB — CREAT BLDA-MCNC: 0.8 MG/DL

## 2021-06-15 PROCEDURE — 82565 ASSAY OF CREATININE: CPT

## 2021-06-15 PROCEDURE — 71260 CT THORAX DX C+: CPT

## 2021-06-15 PROCEDURE — 74177 CT ABD & PELVIS W/CONTRAST: CPT

## 2021-06-15 PROCEDURE — 0 IOPAMIDOL PER 1 ML: Performed by: INTERNAL MEDICINE

## 2021-06-15 RX ADMIN — IOPAMIDOL 100 ML: 755 INJECTION, SOLUTION INTRAVENOUS at 07:51

## 2021-12-07 ENCOUNTER — TRANSCRIBE ORDERS (OUTPATIENT)
Dept: ADMINISTRATIVE | Facility: HOSPITAL | Age: 76
End: 2021-12-07

## 2021-12-07 DIAGNOSIS — C91.10 LEUKEMIA, LYMPHOCYTIC, CHRONIC (HCC): Primary | ICD-10-CM

## 2021-12-21 ENCOUNTER — HOSPITAL ENCOUNTER (OUTPATIENT)
Dept: CT IMAGING | Facility: HOSPITAL | Age: 76
Discharge: HOME | End: 2021-12-21
Admitting: INTERNAL MEDICINE

## 2021-12-21 DIAGNOSIS — C91.10 LEUKEMIA, LYMPHOCYTIC, CHRONIC (HCC): ICD-10-CM

## 2021-12-21 LAB — CREAT BLDA-MCNC: 0.8 MG/DL

## 2021-12-21 PROCEDURE — 0 IOPAMIDOL PER 1 ML: Performed by: INTERNAL MEDICINE

## 2021-12-21 PROCEDURE — 82565 ASSAY OF CREATININE: CPT

## 2021-12-21 PROCEDURE — 71260 CT THORAX DX C+: CPT

## 2021-12-21 PROCEDURE — 74177 CT ABD & PELVIS W/CONTRAST: CPT

## 2021-12-21 RX ADMIN — IOPAMIDOL 100 ML: 755 INJECTION, SOLUTION INTRAVENOUS at 15:55

## 2023-01-13 ENCOUNTER — TRANSCRIBE ORDERS (OUTPATIENT)
Dept: ADMINISTRATIVE | Facility: HOSPITAL | Age: 78
End: 2023-01-13
Payer: MEDICARE

## 2023-01-13 DIAGNOSIS — C91.12 CLL (CHRONIC LYMPHOID LEUKEMIA) IN RELAPSE: Primary | ICD-10-CM

## 2023-01-13 DIAGNOSIS — C91.12 CHRONIC LYMPHOID LEUKEMIA IN RELAPSE: Primary | ICD-10-CM

## 2023-02-03 ENCOUNTER — HOSPITAL ENCOUNTER (OUTPATIENT)
Dept: CT IMAGING | Facility: HOSPITAL | Age: 78
Discharge: HOME | End: 2023-02-03
Admitting: INTERNAL MEDICINE
Payer: MEDICARE

## 2023-02-03 DIAGNOSIS — C91.12 CHRONIC LYMPHOID LEUKEMIA IN RELAPSE: ICD-10-CM

## 2023-02-03 DIAGNOSIS — C91.12 CLL (CHRONIC LYMPHOID LEUKEMIA) IN RELAPSE: ICD-10-CM

## 2023-02-03 LAB
CREAT BLDA-MCNC: 0.6 MG/DL
EGFRCR SERPLBLD CKD-EPI 2021: 92 ML/MIN/1.73

## 2023-02-03 PROCEDURE — 82565 ASSAY OF CREATININE: CPT

## 2023-02-03 PROCEDURE — 71260 CT THORAX DX C+: CPT

## 2023-02-03 PROCEDURE — 0 IOPAMIDOL PER 1 ML: Performed by: INTERNAL MEDICINE

## 2023-02-03 PROCEDURE — 74177 CT ABD & PELVIS W/CONTRAST: CPT

## 2023-02-03 RX ADMIN — IOPAMIDOL 100 ML: 755 INJECTION, SOLUTION INTRAVENOUS at 15:02
